# Patient Record
Sex: MALE | Race: WHITE
[De-identification: names, ages, dates, MRNs, and addresses within clinical notes are randomized per-mention and may not be internally consistent; named-entity substitution may affect disease eponyms.]

---

## 2019-12-17 ENCOUNTER — HOSPITAL ENCOUNTER (EMERGENCY)
Dept: HOSPITAL 76 - ED | Age: 39
Discharge: HOME | End: 2019-12-17
Payer: OTHER GOVERNMENT

## 2019-12-17 VITALS — SYSTOLIC BLOOD PRESSURE: 136 MMHG | DIASTOLIC BLOOD PRESSURE: 80 MMHG

## 2019-12-17 DIAGNOSIS — F17.200: ICD-10-CM

## 2019-12-17 DIAGNOSIS — L08.9: Primary | ICD-10-CM

## 2019-12-17 PROCEDURE — 99283 EMERGENCY DEPT VISIT LOW MDM: CPT

## 2019-12-17 NOTE — ED PHYSICIAN DOCUMENTATION
PD HPI SKIN





- Stated complaint


Stated Complaint: R FOOT PX





- Chief complaint


Chief Complaint: Wound





- History obtained from


History obtained from: Patient





- History of Present Illness


Timing - onset: How many days ago (He had a previous lump in his foot break open

and has some purulent discharge last night.  It is draining a little bit still 

this morning.  He has had 2 lumps on his foot for a year or more after stepping 

on a nail.  He thought that may be some foreign body still left in there.  He 

had seen an orthopedist and had x-ray and MRI without any residual foreign body 

seen.  However he does have a rounded area of granulation tissue that has been 

unknowingly in the way on the bottom of his foot.  However the orthopedic 

surgeon did not want to remove it at the time.  He is recently moved up here.  

He had not had it drained fluid before.)


Timing - details: Abrupt onset (the drainage was abrupt last night. Has had 

local lump/swelling there for a year or so)


Location: RLE (bottom middle arch of right foot.)


Quality / character: Painful, Draining


Similar symptoms before: No diagnosis





Review of Systems


Constitutional: denies: Fever, Chills


GI: denies: Nausea, Vomiting





PD PAST MEDICAL HISTORY





- Past Medical History


Past Medical History: Yes


Cardiovascular: None


Respiratory: None


Neuro: None


Endocrine/Autoimmune: None


GI: None


: None


HEENT: None


Psych: None


Musculoskeletal: None


Derm: None





- Past Surgical History


Past Surgical History: Yes





- Present Medications


Home Medications: 


                                Ambulatory Orders











 Medication  Instructions  Recorded  Confirmed


 


Doxycycline Monohydrate 100 mg PO BID #14 tablet 12/17/19 


 


Ibuprofen [Motrin] 600 mg PO TID PRN #25 tab 12/17/19 


 


Mupirocin 1 applic TP TID #15 g 12/17/19 














- Allergies


Allergies/Adverse Reactions: 


                                    Allergies











Allergy/AdvReac Type Severity Reaction Status Date / Time


 


No Known Drug Allergies Allergy   Verified 12/17/19 12:10














- Social History


Does the pt smoke?: Yes


Smoking Status: Current every day smoker


Does the pt drink ETOH?: Yes


Does the pt have substance abuse?: Yes


Substance Use and Type: Marijuana





- Immunizations


Immunizations are current?: Yes





- POLST


Patient has POLST: No





PD ED PE NORMAL





- Vitals


Vital signs reviewed: Yes





- General


General: Alert and oriented X 3, Well developed/nourished





- Derm


Derm: Normal color, Warm and dry





- Extremities


Extremities: Other (The bottom of the right foot and the lateral aspect of the 

longitudinal arch shows 2 rounded firm areas without redness.  Each is about 2 

cm in size and adjacent to each other.  1 of them has an opening of the skin 

small ulceration with mild purulent discharge.  Bedside ultrasound showed some 

thicker granulation tissue walling off both sites under the skin without any 

residual fluid collections at either spot.)





- Neuro


Neuro: No motor deficit, No sensory deficit





Results





- Vitals


Vitals: 


                               Vital Signs - 24 hr











  12/17/19 12/17/19





  12:10 14:41


 


Temperature 36.6 C 36.6 C


 


Heart Rate 66 61


 


Respiratory 15 18





Rate  


 


Blood Pressure 143/89 H 136/80 H


 


O2 Saturation 100 98








                                     Oxygen











O2 Source                      Room air

















PD MEDICAL DECISION MAKING





- ED course


Complexity details: considered differential (bedside U/S showed the area to be 

drained with firm granulation tissue surrounding the cavity. It does not appear 

to need further I&D.), d/w patient


ED course: 





I referred the patient to podiatry and also orthopedics.  I think he would 

benefit from having these areas of granulation tissue removed from the bottom of

 his foot as they do look bothersome to walk on and since the one has now gotten

 infected.  We will treat the infection now and he can follow-up with them for 

potential surgical removal.  They are not deep to involve the tendons or such so

 I would think would be easily surgically removed.





Departure





- Departure


Disposition: 01 Home, Self Care


Clinical Impression: 


 Foot infection





Condition: Stable


Record reviewed to determine appropriate education?: Yes


Follow-Up: 


[facility] [Provider Group]


Danilo Oh MD [Provider Admit Priv/Credential] - 


Prescriptions: 


Doxycycline Monohydrate 100 mg PO BID #14 tablet


Ibuprofen [Motrin] 600 mg PO TID PRN #25 tab


 PRN Reason: Pain


Mupirocin 1 applic TP TID #15 g


Comments: 


Soak your food in warm water to 3 times a day.  You can apply antibiotic 

ointment mupirocin 2 the open sore.  Doxycycline antibiotic twice daily for a 

week.  Add ibuprofen 3 times a day for pain and inflammation.





With this regimen, the infection should clear.  Also call to follow-up with 

either podiatry or orthopedics for evaluation to have the underlying granulation

 tissue (scar tissue) removed.


Discharge Date/Time: 12/17/19 14:45

## 2020-04-26 ENCOUNTER — HOSPITAL ENCOUNTER (EMERGENCY)
Dept: HOSPITAL 76 - ED | Age: 40
Discharge: HOME | End: 2020-04-26
Payer: OTHER GOVERNMENT

## 2020-04-26 VITALS — SYSTOLIC BLOOD PRESSURE: 156 MMHG | DIASTOLIC BLOOD PRESSURE: 89 MMHG

## 2020-04-26 DIAGNOSIS — K04.7: Primary | ICD-10-CM

## 2020-04-26 DIAGNOSIS — F17.200: ICD-10-CM

## 2020-04-26 PROCEDURE — 99282 EMERGENCY DEPT VISIT SF MDM: CPT

## 2020-04-26 NOTE — ED PHYSICIAN DOCUMENTATION
PD HPI HEENT





- Stated complaint


Stated Complaint: TOOTH PX





- Chief complaint


Chief Complaint: Heent





- History obtained from


History obtained from: Patient





- History of Present Illness


Timing - onset: How many days ago (3)


Timing - duration: Days (3)


Timing - details: Gradual onset, Still present


Location: Tooth


Improves: Medication


Worsens: Everything


Associated symptoms: Headache.  No: Fever, Congestion, Rhinorrhea, Cough


Similar symptoms before: Has not had sx before


Recently seen: Not recently seen





- Additional information


Additional information: 





Previously well 40-year-old male has developed some pain and swelling in his 

right lower jaw.  He has had mounting pain in his pain is now become intolerable

as well as the swelling.  He has had symptoms for about 3 days and this morning 

they are intolerable.  He has not had fever he has not had this happen to him 

previously he does have a broken tooth on that side and feels that that is the 

culprit on this condition as the tooth is very tender.





Review of Systems


Constitutional: denies: Fever, Chills, Myalgias


Eyes: denies: Decreased vision


Ears: denies: Ear pain


Nose: denies: Rhinorrhea / runny nose, Congestion


Throat: reports: Dental pain / toothache


Cardiac: denies: Chest pain / pressure, Palpitations


Respiratory: denies: Dyspnea, Cough





PD PAST MEDICAL HISTORY





- Past Medical History


Past Medical History: No


Cardiovascular: None


Respiratory: None


Neuro: None


Endocrine/Autoimmune: None


GI: None


: None


HEENT: None


Psych: None


Musculoskeletal: None


Derm: None





- Past Surgical History


Past Surgical History: Yes





- Present Medications


Home Medications: 


                                Ambulatory Orders











 Medication  Instructions  Recorded  Confirmed


 


Doxycycline Monohydrate 100 mg PO BID #14 tablet 12/17/19 


 


Ibuprofen [Motrin] 600 mg PO TID PRN #25 tab 12/17/19 


 


Mupirocin 1 applic TP TID #15 g 12/17/19 


 


Amoxicillin 875 mg PO BID #14 tablet 04/26/20 


 


Hydrocodone/Acetaminophen 1 - 2 each PO Q6H PRN #14 tablet 04/26/20 





[Hydrocodon-Acetaminophen 5-325]   














- Allergies


Allergies/Adverse Reactions: 


                                    Allergies











Allergy/AdvReac Type Severity Reaction Status Date / Time


 


No Known Drug Allergies Allergy   Verified 12/17/19 12:10














- Social History


Does the pt smoke?: Yes


Smoking Status: Current every day smoker


Does the pt drink ETOH?: Yes


Does the pt have substance abuse?: Yes





- Immunizations


Immunizations are current?: Yes





- POLST


Patient has POLST: No





PD ED PE NORMAL





- Vitals


Vital signs reviewed: Yes (hypertensive )





- General


General: Alert and oriented X 3, No acute distress, Well developed/nourished





- HEENT


HEENT: Atraumatic, PERRL, EOMI, Other (There is swelling to the right lower jaw.

The swelling is firm to palpation externally and over the buccal/gingival fold. 

There is a broken tooth on the right lower next to the last molar. )





- Neck


Neck: Supple, no meningeal sign





- Respiratory


Respiratory: No respiratory distress





- Derm


Derm: Normal color, Warm and dry, No rash





- Extremities


Extremities: No deformity, No edema





- Neuro


Neuro: Alert and oriented X 3, CNs 2-12 intact, No motor deficit, No sensory 

deficit, Normal speech


Eye Opening: Spontaneous


Motor: Obeys Commands


Verbal: Oriented


GCS Score: 15





- Psych


Psych: Normal mood, Normal affect





Results





- Vitals


Vitals: 


                               Vital Signs - 24 hr











  04/26/20





  13:35


 


Temperature 37 C


 


Heart Rate 56 L


 


Respiratory 18





Rate 


 


Blood Pressure 156/89 H


 


O2 Saturation 99








                                     Oxygen











O2 Source                      Room air

















PD MEDICAL DECISION MAKING





- ED course


Complexity details: reviewed old records, considered differential, d/w patient


ED course: 





40-year-old male with swelling to the right lower jaw has firm swelling without 

fluctuance and he has an impressive amount of swelling.  He has a broken tooth. 

We will place him on some amoxicillin and pain medication and asked him to 

follow-up here should he have development of fluctuance and to follow-up with 

his dentist for repair of his tooth.





Departure





- Departure


Disposition: 01 Home, Self Care


Clinical Impression: 


 Dental abscess





Condition: Stable


Instructions:  ED Abscess Dental


Follow-Up: 


[facility] [Provider Group]


Prescriptions: 


Amoxicillin 875 mg PO BID #14 tablet


Hydrocodone/Acetaminophen [Hydrocodon-Acetaminophen 5-325] 1 - 2 each PO Q6H PRN

#14 tablet


 PRN Reason: pain

## 2021-04-11 ENCOUNTER — HOSPITAL ENCOUNTER (EMERGENCY)
Dept: HOSPITAL 76 - ED | Age: 41
Discharge: HOME | End: 2021-04-11
Payer: OTHER GOVERNMENT

## 2021-04-11 VITALS — DIASTOLIC BLOOD PRESSURE: 65 MMHG | SYSTOLIC BLOOD PRESSURE: 116 MMHG

## 2021-04-11 DIAGNOSIS — Z87.891: ICD-10-CM
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
## 2021-04-11 DIAGNOSIS — S62.522B: Primary | ICD-10-CM

## 2021-04-11 PROCEDURE — 73140 X-RAY EXAM OF FINGER(S): CPT

## 2021-04-11 PROCEDURE — 99283 EMERGENCY DEPT VISIT LOW MDM: CPT

## 2021-04-11 PROCEDURE — 90471 IMMUNIZATION ADMIN: CPT

## 2021-04-11 PROCEDURE — 90715 TDAP VACCINE 7 YRS/> IM: CPT

## 2021-04-11 PROCEDURE — 12002 RPR S/N/AX/GEN/TRNK2.6-7.5CM: CPT

## 2021-04-11 NOTE — XRAY REPORT
PROCEDURE:  Finger(s) LT

 

INDICATIONS:  L thumb vs ax

 

TECHNIQUE:  AP hand, 2 views of the first finger(s) acquired.  

 

COMPARISON:  None

 

FINDINGS:  

 

Bones:  There is a minimal, comminuted fracture seen of the distal tip of the distal phalanx of the t
humb. No additional fractures or dislocations.  No suspicious bony lesions.  

 

Soft tissues: Associated soft tissue injury can be seen involving the distal tip of the thumb.

 

 

 

IMPRESSION:  

 

Minimal comminuted fracture seen involving the distal tip of the distal phalanx of the thumb, with as
sociated soft tissue injury.

 

Reviewed by: Kwasi Tobias MD on 4/11/2021 4:11 PM KAVIN

Approved by: Kwasi Tobias MD on 4/11/2021 4:11 PM KAVIN

 

 

Station ID:  [id number]

## 2021-04-11 NOTE — EXTERNAL MEDICAL SUMMARY RPT
Continuity of Care Document

                            Created on:2021



Patient:CHARLES DAS

Sex:Male

:1980

External Reference #:[id number]





Demographics







                          Phone                     Unavailable

 

                          Preferred Language        Unknown

 

                          Marital Status            Unknown

 

                          Yazidi Affiliation     Unknown

 

                          Race                      Unknown

 

                          Ethnic Group              Unknown









Author







                          Organization              [facility]

 

                          Address                    [place] [id number]

 

                          Phone                     [phone]









Social History







                     date                description         facility

 

                     [id number]

## 2021-04-11 NOTE — ED PHYSICIAN DOCUMENTATION
PD HPI UPPER EXT INJURY





- Stated complaint


Stated Complaint: LT THUMB LAC





- Chief complaint


Chief Complaint: Laceration





- History obtained from


History obtained from: Patient





- History of Present Illness


Location: Left, Finger (thumb)


Type of injury: Laceration


Where injury occurred: Home


Pain level max: 6


Pain level now: 4


Improved by: Rest


Worsened by: Moving, Palpating


Associated symptoms: No: Weakness, Numbness, Tingling, Swelling





- Additonal information


Additional information: 





41-year-old male with a laceration to the left thumb from an ax while cutting 

kindling today. Unknown last tetanus shot. Patient is right-handed.





Review of Systems


Constitutional: denies: Fever


GI: denies: Vomiting


Skin: denies: Rash





PD PAST MEDICAL HISTORY





- Past Medical History


Past Medical History: Yes


Cardiovascular: None


Respiratory: None


Neuro: None


Endocrine/Autoimmune: None


GI: None


: None


HEENT: None


Psych: None


Musculoskeletal: None


Derm: None





- Past Surgical History


Past Surgical History: Yes





- Present Medications


Home Medications: 


                                Ambulatory Orders











 Medication  Instructions  Recorded  Confirmed


 


cephALEXin [Keflex] 500 mg PO Q6H #28 cap 04/11/21 














- Allergies


Allergies/Adverse Reactions: 


                                    Allergies











Allergy/AdvReac Type Severity Reaction Status Date / Time


 


No Known Drug Allergies Allergy   Verified 04/11/21 16:36














- Social History


Does the pt smoke?: No


Smoking Status: Former smoker


Does the pt drink ETOH?: No


Does the pt have substance abuse?: Yes


Substance Use and Type: Marijuana





- Immunizations


Immunizations are current?: Yes





- POLST


Patient has POLST: No





PD ED PE NORMAL





- Vitals


Vital signs reviewed: Yes





- General


General: Alert and oriented X 3, No acute distress





- HEENT


HEENT: Moist mucous membranes





- Derm


Derm: Warm and dry





- Neuro


Neuro: Alert and oriented X 3





- Psych


Psych: Normal mood, Normal affect





PD ED PE EXPANDED





- Extremities


LEXY UE/Hands Visual: 


                            __________________________














                            __________________________





 1 - laceration (3cm, curved, through nail and pad of thumb)








Results





- Vitals


Vitals: 


                               Vital Signs - 24 hr











  04/11/21 04/11/21





  16:37 17:54


 


Temperature 36.2 C L 37.1 C


 


Heart Rate 56 L 67


 


Respiratory 18 16





Rate  


 


Blood Pressure 128/76 116/65


 


O2 Saturation 100 96








                                     Oxygen











O2 Source                      Room air

















- Rads (name of study)


  ** L thumb xray


Radiology: Prelim report reviewed, EMP read contemporaneously, See rad report 

(Minimal comminuted fracture seen involving the distal tip of the distal phalanx

 of the thumb, with associated soft tissue injury. )





Procedures





- Laceration (location)


  ** L thumb


Length in cm: 3


Wound type: Flap


Neurovascular status: Sensory intact, Motor intact, Vascular intact


Tendon involvement: Tendon intact


Anesthesia: OTH (0.5% ropivicaine)


Wound preparation: Irrigated copiously NS, Wound explored, To the base


Skin layer closure: Nylon, Dermabond, Interrupted, Size #-0 - enter number (4)


Other: Patient tolerated well, No complications, Neurovascular intact, Tetanus 

booster given (tdap)





PD MEDICAL DECISION MAKING





- ED course


Complexity details: reviewed results, re-evaluated patient, considered 

differential, d/w patient


ED course: 





Laceration repaired.  Tolerated well.  We will place on Keflex.  There is a 

minimal comminuted fracture in the distal tip of the phalanx.  Placed in a 

splint.  We will have him follow-up with his doctor for wound check in about a 

week.  Patient counseled regarding signs and symptoms for which I believe and 

urgent re-evaluation would be necessary. Patient with good understanding of and 

agreement to plan and is comfortable going home at this time





This document was made in part using voice recognition software. While efforts 

are made to proofread this document, sound alike and grammatical errors may 

occur.





Departure





- Departure


Disposition: 01 Home, Self Care


Clinical Impression: 


Laceration of finger


Qualifiers:


 Encounter type: initial encounter Finger: thumb Damage to nail status: with 

damage Foreign body presence: without foreign body Laterality: left Qualified 

Code(s): S61.112A - Laceration without foreign body of left thumb with damage to

 nail, initial encounter





Condition: Good


Instructions:  ED Laceration Hand


Follow-Up: 


Your,doctor in 1 week [Other]


Prescriptions: 


cephALEXin [Keflex] 500 mg PO Q6H #28 cap


Comments: 


Follow-up with your doctor in about 1 week for a wound check.  Take all 

antibiotics until gone.  Return if you worsen.  Keep the wound clean.  The glue 

will dissolve on its own.  The laceration with the sutures will need to be 

removed.  These need to be removed in about 10 to 14 days.


Discharge Date/Time: 04/11/21 18:08

## 2021-08-07 ENCOUNTER — HOSPITAL ENCOUNTER (EMERGENCY)
Dept: HOSPITAL 76 - ED | Age: 41
Discharge: HOME | End: 2021-08-07
Payer: OTHER GOVERNMENT

## 2021-08-07 VITALS — SYSTOLIC BLOOD PRESSURE: 107 MMHG | DIASTOLIC BLOOD PRESSURE: 63 MMHG
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
## 2021-08-07 DIAGNOSIS — R11.2: ICD-10-CM

## 2021-08-07 DIAGNOSIS — Z87.891: ICD-10-CM

## 2021-08-07 DIAGNOSIS — S70.12XA: Primary | ICD-10-CM
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
## 2021-08-07 DIAGNOSIS — R19.7: ICD-10-CM

## 2021-08-07 PROCEDURE — 73502 X-RAY EXAM HIP UNI 2-3 VIEWS: CPT

## 2021-08-07 PROCEDURE — 99283 EMERGENCY DEPT VISIT LOW MDM: CPT

## 2021-08-07 PROCEDURE — 99284 EMERGENCY DEPT VISIT MOD MDM: CPT

## 2021-08-07 NOTE — ED PHYSICIAN DOCUMENTATION
PD HPI LOWER EXT INJURY





- Stated complaint


Stated Complaint: LEFT LEG INJURY





- Chief complaint


Chief Complaint: General





- History obtained from


History obtained from: Patient





- History of Present Illness


PD HPI LOW EXT INJURY LOCATION: Left, Hip, Upper leg


Type of injury: Fall (he fell from skateboard onto left hip/thigh with swelling 

and bruising. Has pain on ROM. last evening also had nausea/vomiting few times/ 

and some diarrhea. Denies impact to abd/chest with the fall and not having abd 

pain per se.)


Where injury occurred: Street


Timing - onset: Yesterday


Timing - duration: Days (1)


Timing - details: Abrupt onset, Still present


Worsened by: Moving, Palpating


Associated symptoms: Swelling, Discolored (brusing color lateral proximal 

thigh).  No: Weakness, Numbness


Contributing factors: No: Anticoagulated, Prosthetic joint


Similar symptoms before: Has not had sx before


Recently seen: Not recently seen





Review of Systems


Constitutional: denies: Fever, Chills


Nose: denies: Rhinorrhea / runny nose, Congestion


Throat: denies: Sore throat


Respiratory: denies: Cough


GI: reports: Nausea, Vomiting, Diarrhea.  denies: Abdominal Pain, Abdominal 

Swelling, Hematemesis, Bloody / black stool


: denies: Dysuria, Frequency


Skin: reports: Abrasion (s) (knees)


Musculoskeletal: denies: Neck pain, Back pain


Neurologic: denies: Altered mental status, Head injury, LOC





PD PAST MEDICAL HISTORY





- Past Medical History


Cardiovascular: None


Respiratory: None


Neuro: None


Endocrine/Autoimmune: None


GI: None


: None


HEENT: None


Psych: None


Musculoskeletal: None


Derm: None





- Past Surgical History


Past Surgical History: Yes





- Present Medications


Home Medications: 


                                Ambulatory Orders











 Medication  Instructions  Recorded  Confirmed


 


HYDROcod/ACETAM 5/325 [Norco 5/325] 1 ea PO Q6H PRN #12 tablet 08/07/21 


 


Ondansetron Odt [Zofran] 4 mg TL Q6H PRN #12 tablet 08/07/21 














- Allergies


Allergies/Adverse Reactions: 


                                    Allergies











Allergy/AdvReac Type Severity Reaction Status Date / Time


 


No Known Drug Allergies Allergy   Verified 08/07/21 13:54














- Social History


Does the pt smoke?: No


Smoking Status: Former smoker


Does the pt drink ETOH?: No


Does the pt have substance abuse?: Yes





- Immunizations


Immunizations are current?: Yes





- POLST


Patient has POLST: No





PD ED PE NORMAL





- Vitals


Vital signs reviewed: Yes





- General


General: Alert and oriented X 3, Well developed/nourished, Other (does appear 

somewhat uncomfortable with left hip/thigh movement and palpation. )





- HEENT


HEENT: Atraumatic





- Neck


Neck: Supple, no meningeal sign, No bony TTP, No adenopathy





- Cardiac


Cardiac: RRR, No murmur





- Respiratory


Respiratory: Clear bilaterally





- Abdomen


Abdomen: Normal bowel sounds, Soft, Non tender, Non distended





- Back


Back: No spinal TTP





- Derm


Derm: Normal color, Warm and dry





- Extremities


Extremities: Other (left lateral proximal thigh over the trochanter area with 

firm swelling and some bruising c/w hematoma. )





- Neuro


Neuro: Alert and oriented X 3, No motor deficit, No sensory deficit, Normal 

speech





Results





- Vitals


Vitals: 


                               Vital Signs - 24 hr











  08/07/21





  13:53


 


Temperature 37.1 C


 


Heart Rate 80


 


Respiratory 18





Rate 


 


Blood Pressure 107/63


 


O2 Saturation 99








                                     Oxygen











O2 Source                      Room air

















- Rads (name of study)


  ** left hip


Radiology: Prelim report reviewed (no fractures), See rad report





PD MEDICAL DECISION MAKING





- ED course


Complexity details: reviewed results, considered differential (thigh hematoma. 

Xray to ensure not greater trochanter fracture, which was normal. I think his 

nausea/vomiting were unrelated (had some diarrhea as well). No abd tenderness 

nor pain and he did not feel that he impacted abd when fell, so does not seem 

like abd organ injury.), d/w patient





Departure





- Departure


Disposition: 01 Home, Self Care


Clinical Impression: 


 Fall from skateboard, initial encounter





Thigh hematoma


Qualifiers:


 Encounter type: initial encounter Laterality: left Qualified Code(s): S70.12XA 

- Contusion of left thigh, initial encounter





Nausea and vomiting


Qualifiers:


 Vomiting type: unspecified Vomiting Intractability: non-intractable Qualified 

Code(s): R11.2 - Nausea with vomiting, unspecified





Condition: Stable


Instructions:  ED Hematoma


Prescriptions: 


HYDROcod/ACETAM 5/325 [Norco 5/325] 1 ea PO Q6H PRN #12 tablet


 PRN Reason: Pain


Ondansetron Odt [Zofran] 4 mg TL Q6H PRN #12 tablet


 PRN Reason: Nausea / Vomiting


Comments: 


No fracture on x-ray.  He still have a lot of swelling there from a hematoma in 

the muscle.  This should go down gradually over several days to week.  Progress 

activity as tolerated.  Warm compresses to the area to soften the hematoma and 

allow better absorption.





Consider anti-inflammatory such as naproxen or ibuprofen 2-3 times a day with 

food.  Add Tylenol if needed for pain.  Add hydrocodone every 4-6 hours if 

needed for worse pain in the short-term.  I would anticipate improvement over 

the next few days.





Ondansatron as needed for nausea.





My narcotic instructions: I am prescribing a short course of narcotic pain 

medication for you.  These are potentially dangerous and addictive medications 

that should be used carefully.


These medications may constipate you.  Take an over-the-counter stool softener 

such as docusate twice daily with plenty of water while taking these 

medications.  If you go 24 hours without a bowel movement, take over-the-counter

 MiraLAX, per package instructions.


Do not drink or drive while taking these medications.


If you received narcotic or sedating medications while in the emergency 

department do not drive for 24 hours.  Store this medication in a safe, secure 

place and out of reach of children.


It is a violation of federal law to give or sell this medication to another 

person or to use in a manner other than prescribed.


The ED will not refill narcotic prescriptions, including prescriptions lost or 

stolen.  You can dispose of unwanted medications at the [facility]'s office 

or at several pharmacies such as [facility].


Discharge Date/Time: 08/07/21 15:09

## 2021-08-07 NOTE — XRAY REPORT
PROCEDURE:  Hip w/Pelvis 2-3V LT

 

INDICATIONS:  fall with injury to lateral hip

 

TECHNIQUE:  AP pelvis with lateral view(s) of the left hip(s).  

 

COMPARISON:  None.

 

FINDINGS:  

 

Bones:  No fractures or dislocations.  Pelvic ring appears intact.  No suspicious bony lesions. Royal
us appear to be noted in left hip compatible with mild osteoarthritis. 

 

Soft tissues:  The visualized bowel gas pattern is normal.  No suspicious soft tissue calcifications.
  

 

 

IMPRESSION:  

 

No fracture. No osseous lesion. If there are persistent symptoms or continued clinical concern for pa
thology, then repeat plain film radiographs (7-10 days) or advanced imaging (CT, MR, bone scan) shoul
d be considered for further evaluation.

 

Reviewed by: Cailin Ceballos MD, PhD on 8/7/2021 2:46 PM PDT

Approved by: Cailin Ceballos MD, PhD on 8/7/2021 2:46 PM PDT

 

 

Station ID:  [id number]